# Patient Record
Sex: FEMALE | Race: WHITE | NOT HISPANIC OR LATINO | Employment: FULL TIME | URBAN - METROPOLITAN AREA
[De-identification: names, ages, dates, MRNs, and addresses within clinical notes are randomized per-mention and may not be internally consistent; named-entity substitution may affect disease eponyms.]

---

## 2024-04-17 ENCOUNTER — TRANSFERRED RECORDS (OUTPATIENT)
Dept: HEALTH INFORMATION MANAGEMENT | Facility: CLINIC | Age: 39
End: 2024-04-17

## 2024-06-10 ENCOUNTER — TRANSFERRED RECORDS (OUTPATIENT)
Dept: HEALTH INFORMATION MANAGEMENT | Facility: CLINIC | Age: 39
End: 2024-06-10
Payer: COMMERCIAL

## 2024-06-12 ENCOUNTER — TRANSCRIBE ORDERS (OUTPATIENT)
Dept: OTHER | Age: 39
End: 2024-06-12

## 2024-06-12 ENCOUNTER — TELEPHONE (OUTPATIENT)
Dept: OPHTHALMOLOGY | Facility: CLINIC | Age: 39
End: 2024-06-12
Payer: COMMERCIAL

## 2024-06-12 DIAGNOSIS — G44.009 ATYPICAL CLUSTER HEADACHE: Primary | ICD-10-CM

## 2024-06-12 NOTE — TELEPHONE ENCOUNTER
[facility] Call Center    Phone Message    May a detailed message be left on voicemail: yes     Reason for Call: Appointment Intake    Referring Provider Name:Referred by [facility]   Phone: [phone]       Diagnosis and/or Symptoms: Neuro Ophthalmology New Onset atypical headache around right eye ( 7 episodes in the last 2 months, UL ptosis during 2 of these episodes)     Requesting Dr. Quinn, please review    Action Taken: Message routed to:  [facility] eye    Travel Screening: Not Applicable     Date of Service:

## 2024-06-12 NOTE — TELEPHONE ENCOUNTER
Called and spoke to Shari     Made her an appointment in July with Dr. Velasquez     Discussed     Wait time     Billing / insurance     Clinic location     Parking / cost     Requesting a new pt packet     Eusebia Mckoy Communication Facilitator on 6/12/2024 at 12:33 PM

## 2024-07-09 NOTE — PROGRESS NOTES
"     Shari Henley is a 39 year old female with the following diagnoses:   1. Headache around the eyes        HPI:    Shari reports that beginning 3 months ago, she started getting right-sided headaches above her eyebrow and along the side of her nose across her cheek area.  The right side of her nose feels congested and her right eyelid gets puffy and lau.      Headaches occur 1-2x/week, typically in the evening most often when she is tired.  Headache comes on \"out of nowhere.\"  She experiences a sharp pain that can persist for an hour at most.  Some relief with ibuprofen.    She denies any double vision or associated vision changes.  Her eye puffiness and tearing only occur in the setting of headaches.    Shari reports that upon discussion with her primary care doctor, she did have an MRI which was had findings consistent with migraine.    She denies recent health changes or other symptoms.  She has generally been a frontal headache person, but these are different than her typical episodes.  No previous migraine history or family history.    Independent historians:  Patient    Review of outside testing:  None    Review of outside clinical notes:  Visit with Dr. Bri Swanson 6/10/24:      Past medical history:  Unremarkable      Medications:   amitriptyline  sertraline    Social history:  Patient smokes and drinks.      Exam:  Uncorrected distance visual acuity was 20/20 in the right eye and 20/20 in the left eye. Intraocular pressure was 9 in the right eye and 9 in the left eye using Tonopen.  Color vision 11/11 right eye and 11/11 left eye.  Pupils isocoric with no APD.    See complete chart note for anterior segment, fundus, and strabismus exam.    Assessment/Plan:   Today, Shari reports an intermittent, puffy right upper lid in association with new headaches along the right side of her head/sinuses.  She reports these headaches are new in the last 3 months and are typically accompanied by " right UL swelling, right eye tearing, and nasal drip/congestion.  She did have an MRI, which per patient, had findings consistent with migraine (no images/reports available today).    I reassured her of an intact ocular health exam today.  Given that these symptoms coincide with headache, I recommended follow-up with headache neurology.  She describes symptoms that could be in the family of trigeminal autonomic cephalalgias (SUNCT, however her episodes are longer than seconds-min).  I advised her to return to clinic immediately in the event of persistent eyelid swelling, double vision, vision changes, or other worsening symptoms.  Follow-up with me as needed.    Complete documentation of historical and exam elements from today's encounter can be found in the full encounter summary report (not reduplicated in this progress note). I personally obtained the chief complaint(s) and history of present illness. I confirmed and edited as necessary the review of systems, past medical/surgical history, family history, social history, and examination findings as document by others; and I examined the patient myself. I personally reviewed the relevant tests, images, and reports as documented above. I formulated and edited as necessary the assessment and plan and discussed the findings and management plan with the patient and family.    Itzel Velasquez, OD

## 2024-07-15 ENCOUNTER — OFFICE VISIT (OUTPATIENT)
Dept: OPHTHALMOLOGY | Facility: CLINIC | Age: 39
End: 2024-07-15
Attending: OPHTHALMOLOGY
Payer: COMMERCIAL

## 2024-07-15 DIAGNOSIS — R51.9 HEADACHE AROUND THE EYES: Primary | ICD-10-CM

## 2024-07-15 PROCEDURE — 99203 OFFICE O/P NEW LOW 30 MIN: CPT

## 2024-07-15 PROCEDURE — 99213 OFFICE O/P EST LOW 20 MIN: CPT

## 2024-07-15 RX ORDER — SERTRALINE HYDROCHLORIDE 100 MG/1
150 TABLET, FILM COATED ORAL DAILY
COMMUNITY
Start: 2023-09-20

## 2024-07-15 ASSESSMENT — CONF VISUAL FIELD
OS_SUPERIOR_TEMPORAL_RESTRICTION: 0
OD_INFERIOR_TEMPORAL_RESTRICTION: 0
OS_INFERIOR_NASAL_RESTRICTION: 0
OS_SUPERIOR_NASAL_RESTRICTION: 0
OS_INFERIOR_TEMPORAL_RESTRICTION: 0
OD_SUPERIOR_NASAL_RESTRICTION: 0
METHOD: COUNTING FINGERS
OD_SUPERIOR_TEMPORAL_RESTRICTION: 0
OS_NORMAL: 1
OD_INFERIOR_NASAL_RESTRICTION: 0
OD_NORMAL: 1

## 2024-07-15 ASSESSMENT — CUP TO DISC RATIO
OD_RATIO: 0.5
OS_RATIO: 0.5

## 2024-07-15 ASSESSMENT — EXTERNAL EXAM - LEFT EYE: OS_EXAM: NORMAL

## 2024-07-15 ASSESSMENT — VISUAL ACUITY
OD_SC: 20/20
OS_SC: 20/20
METHOD: SNELLEN - LINEAR

## 2024-07-15 ASSESSMENT — TONOMETRY
IOP_METHOD: TONOPEN
OS_IOP_MMHG: 9
OD_IOP_MMHG: 9

## 2024-07-15 ASSESSMENT — EXTERNAL EXAM - RIGHT EYE: OD_EXAM: NORMAL

## 2024-07-15 NOTE — NURSING NOTE
Chief Complaints and History of Present Illnesses   Patient presents with    Eye Exam For Headaches     New Pt headache neuro consult.     Chief Complaint(s) and History of Present Illness(es)       Eye Exam For Headaches              Laterality: right eye    Associated symptoms: droopy eyelid, headaches and jaw pain.  Negative for eye pain, blurred vision, color vision changes, numbness and tingling    Pain scale: 0/10    Comments: New Pt headache neuro consult.              Comments    REGINALDO was about 3 years ago.  No glasses (Hx of LASIK BE) or CTL's.  Pt states vision is about the same.  No pain in the eyes.  Pt here for headache around RE and right side of head starting about 3 months in the evening ago off and on lasting for an hour.  Being tired makes then worse.  RUL sets swollen or droops (Pt has pics).  Does not affect vision.  No double vision.  RE tears and right sinus gets stuffed.  No flashes/floaters.  No ocular meds.  No DM.    WILLIE Palacios July 15, 2024 8:26 AM

## 2024-10-28 ENCOUNTER — OFFICE VISIT (OUTPATIENT)
Dept: NEUROLOGY | Facility: CLINIC | Age: 39
End: 2024-10-28
Payer: COMMERCIAL

## 2024-10-28 ENCOUNTER — LAB (OUTPATIENT)
Dept: LAB | Facility: CLINIC | Age: 39
End: 2024-10-28
Payer: COMMERCIAL

## 2024-10-28 VITALS
WEIGHT: 194.6 LBS | DIASTOLIC BLOOD PRESSURE: 78 MMHG | SYSTOLIC BLOOD PRESSURE: 115 MMHG | OXYGEN SATURATION: 93 % | HEART RATE: 94 BPM

## 2024-10-28 DIAGNOSIS — R51.9 HEADACHE AROUND THE EYES: ICD-10-CM

## 2024-10-28 DIAGNOSIS — G44.009 ATYPICAL CLUSTER HEADACHE: Primary | ICD-10-CM

## 2024-10-28 LAB
ALBUMIN SERPL BCG-MCNC: 4.6 G/DL (ref 3.5–5.2)
ALP SERPL-CCNC: 55 U/L (ref 40–150)
ALT SERPL W P-5'-P-CCNC: 15 U/L (ref 0–50)
ANION GAP SERPL CALCULATED.3IONS-SCNC: 12 MMOL/L (ref 7–15)
AST SERPL W P-5'-P-CCNC: 16 U/L (ref 0–45)
BASOPHILS # BLD AUTO: 0 10E3/UL (ref 0–0.2)
BASOPHILS NFR BLD AUTO: 1 %
BILIRUB SERPL-MCNC: 0.2 MG/DL
BUN SERPL-MCNC: 12.3 MG/DL (ref 6–20)
CALCIUM SERPL-MCNC: 9.1 MG/DL (ref 8.8–10.4)
CHLORIDE SERPL-SCNC: 104 MMOL/L (ref 98–107)
CREAT SERPL-MCNC: 0.7 MG/DL (ref 0.51–0.95)
EGFRCR SERPLBLD CKD-EPI 2021: >90 ML/MIN/1.73M2
EOSINOPHIL # BLD AUTO: 0.2 10E3/UL (ref 0–0.7)
EOSINOPHIL NFR BLD AUTO: 3 %
ERYTHROCYTE [DISTWIDTH] IN BLOOD BY AUTOMATED COUNT: 13.2 % (ref 10–15)
GLUCOSE SERPL-MCNC: 94 MG/DL (ref 70–99)
HCO3 SERPL-SCNC: 23 MMOL/L (ref 22–29)
HCT VFR BLD AUTO: 43.8 % (ref 35–47)
HGB BLD-MCNC: 14.4 G/DL (ref 11.7–15.7)
IMM GRANULOCYTES # BLD: 0 10E3/UL
IMM GRANULOCYTES NFR BLD: 1 %
LYMPHOCYTES # BLD AUTO: 2.3 10E3/UL (ref 0.8–5.3)
LYMPHOCYTES NFR BLD AUTO: 32 %
MCH RBC QN AUTO: 32.6 PG (ref 26.5–33)
MCHC RBC AUTO-ENTMCNC: 32.9 G/DL (ref 31.5–36.5)
MCV RBC AUTO: 99 FL (ref 78–100)
MONOCYTES # BLD AUTO: 0.5 10E3/UL (ref 0–1.3)
MONOCYTES NFR BLD AUTO: 7 %
NEUTROPHILS # BLD AUTO: 4.1 10E3/UL (ref 1.6–8.3)
NEUTROPHILS NFR BLD AUTO: 57 %
NRBC # BLD AUTO: 0 10E3/UL
NRBC BLD AUTO-RTO: 0 /100
PLATELET # BLD AUTO: 305 10E3/UL (ref 150–450)
POTASSIUM SERPL-SCNC: 4.1 MMOL/L (ref 3.4–5.3)
PROT SERPL-MCNC: 7.2 G/DL (ref 6.4–8.3)
RBC # BLD AUTO: 4.42 10E6/UL (ref 3.8–5.2)
SODIUM SERPL-SCNC: 139 MMOL/L (ref 135–145)
TSH SERPL DL<=0.005 MIU/L-ACNC: 1.46 UIU/ML (ref 0.3–4.2)
WBC # BLD AUTO: 7.1 10E3/UL (ref 4–11)

## 2024-10-28 PROCEDURE — 99204 OFFICE O/P NEW MOD 45 MIN: CPT | Performed by: PSYCHIATRY & NEUROLOGY

## 2024-10-28 PROCEDURE — 36415 COLL VENOUS BLD VENIPUNCTURE: CPT

## 2024-10-28 PROCEDURE — 85004 AUTOMATED DIFF WBC COUNT: CPT

## 2024-10-28 PROCEDURE — 84443 ASSAY THYROID STIM HORMONE: CPT

## 2024-10-28 PROCEDURE — 82947 ASSAY GLUCOSE BLOOD QUANT: CPT

## 2024-10-28 ASSESSMENT — HEADACHE IMPACT TEST (HIT 6)
HOW OFTEN HAVE YOU FELT TOO TIRED TO WORK BECAUSE OF YOUR HEADACHES: NEVER
HIT6 TOTAL SCORE: 40
WHEN YOU HAVE HEADACHES HOW OFTEN IS THE PAIN SEVERE: NEVER
HOW OFTEN HAVE YOU FELT FED UP OR IRRITATED BECAUSE OF YOUR HEADACHES: NEVER
HOW OFTEN DID HEADACHS LIMIT CONCENTRATION ON WORK OR DAILY ACTIVITY: NEVER
HOW OFTEN DID HEADACHS LIMIT CONCENTRATION ON WORK OR DAILY ACTIVITY: NEVER
HOW OFTEN DO HEADACHES LIMIT YOUR DAILY ACTIVITIES: NEVER
WHEN YOU HAVE A HEADACHE HOW OFTEN DO YOU WISH YOU COULD LIE DOWN: SOMETIMES
HIT6 TOTAL SCORE: 40
HOW OFTEN HAVE YOU FELT TOO TIRED TO WORK BECAUSE OF YOUR HEADACHES: NEVER
HOW OFTEN DO HEADACHES LIMIT YOUR DAILY ACTIVITIES: NEVER
HOW OFTEN HAVE YOU FELT FED UP OR IRRITATED BECAUSE OF YOUR HEADACHES: NEVER
WHEN YOU HAVE HEADACHES HOW OFTEN IS THE PAIN SEVERE: NEVER
WHEN YOU HAVE A HEADACHE HOW OFTEN DO YOU WISH YOU COULD LIE DOWN: SOMETIMES

## 2024-10-28 ASSESSMENT — MIGRAINE DISABILITY ASSESSMENT (MIDAS)
TOTAL SCORE: 2
HOW MANY DAYS WAS YOUR PRODUCTIVITY CUT IN HALF BECAUSE OF HEADACHES: 0
HOW MANY DAYS DID YOU MISS WORK OR SCHOOL BECAUSE OF HEADACHES: 0
HOW MANY DAYS IN THE PAST 3 MONTHS HAVE YOU HAD A HEADACHE: 15
ON A SCALE FROM 0-10 ON AVERAGE HOW PAINFUL WERE HEADACHES: 5
HOW MANY DAYS DID YOU NOT DO HOUSEWORK BECAUSE OF HEADACHES: 0
HOW MANY DAYS WAS HOUSEWORK PRODUCTIVITY CUT IN HALF DUE TO HEADACHES: 0
HOW OFTEN WERE SOCIAL ACTIVITIES MISSED DUE TO HEADACHES: 2

## 2024-10-28 NOTE — PROGRESS NOTES
"[facility]   Headache Neurology Consult  October 28, 2024     Shari Henley MRN# [id number]   YOB: 1985 Age: 39 year old     Requesting provider: Itzel Velasquez OD          Assessment and Recommendations:     Shari Henley is a 39 year old female who presents for evaluation of RIGHT unilateral headaches with unilateral autonomic features, most consistent in duration with probable cluster headache. They are somewhat surprisingly not peak severity and the \"probable\" modifer is given as she has had these less than every day in frequency, but meet all other criteria for this headache disorder. She has had a brain MRI, reportedly with a sign of an old injury.    With this modifier and her smoking history, recommend obtaining CT head and neck angiogram as well as as chest CT to assess for causes of compression of the RIGHT sympathetic nerve chain to T1.  Will obtain labs prior to ordering these studies (for renal function testing prior to CT studies).     If these studies are normal, would continue to monitor for recurrence and if worsening over time, would recommend discussing full treatment options of cluster headache which were not today. She endorses a decrease of these symptoms currently and may be either towards the end of a cluster.     Headache treatment plan:  Acute medication recommendations:  Sumatriptan injectable if needed    Preventative medication recommendations:  None indicated at this time    Will see her in return after testing is available.    Total time spent today was 57 minutes in chart review, history, exam and counseling.      Ellen Lamb MD  Neurology            Chief Complaint:     Chief Complaint   Patient presents with    Consult For     Migraines            History is obtained from the patient and medical record.      Shari Henley is a 39 year old female who has had new headaches starting 6 months ago.    She has a history of " headaches that she has attributed to traction of hair pulled. She has had some type of headache daily. She has had bifrontal headaches usually or rare shooting pains in the right parietal region and these are never on the left. The bifrontal ones are mild.     Headaches are periorbital. She saw Dr. Velasquez and was noted to have intermittent R upper eyelid edema, right eye lacrimation and nasal rhinorrhea and congestion reported in the setting of headaches.     She has had unilateral right headache and there has been usually only right 1 eye that is swollen during the times of the headaches and there will be ptosis on the right eye. There is no miosis. There is right eye lacrimation and this will precede the headache. No rhinorrhea or congestion. No redness or facial sweating or swelling. No gritty sensation. Initially were in the back of her throat. It was more maxillary and since that time moved to the periorbital region.  Pain initially was focused on the sinuses or upper jaw line. It lasts 1 hour max without any background pain. This has not been a regular headache. There is no jabbing or intermittent short lasting neuralgiform pain. She reported rubbing her eye a lot, due to clear drainage.    Pain is best characterized as sharp. It will sometime be worse than others and may be dull at times perhaps. This pain began about 6 months ago. They have been decreasing in severity and initially were 2 times per week and now has dropped off about 1 day per month. She does not think she has had 20 at this time. This initial one was 8/10 and was very severe and gradually have gotten less severe and less frequent. Now it will be about a 5/10.    She has noticed onset more often when she is tired (sleep deprived). Alcohol is not a trigger. And she has tried it. There is no relief when it starts, it will eventually pass.     She had normal vision, but she could tell that she had ptosis first.     No consitutional symptoms,  not awakening from sleep, and in the last 2 months, becoming 1 time per month.             Past Medical History:   Anxiety  Normal ocular exam           Past Surgical History:     Past Surgical History:   Procedure Laterality Date    LASIK               Social History:     Social History     Socioeconomic History    Marital status: Patient Declined     Spouse name: Not on file    Number of children: Not on file    Years of education: Not on file    Highest education level: Not on file   Occupational History    Not on file   Tobacco Use    Smoking status: Every Day     Types: Cigarettes, for 10 years, and about 10 cigarettes per day    Smokeless tobacco: Never   Substance and Sexual Activity    Alcohol use: Occasional 2-3 glasses of wine, weekends, not since 3 weeks    Drug use: None    Sexual activity: Not on file   Other Topics Concern    Not on file   Social History Narrative    Not on file     Social Drivers of Health     Financial Resource Strain: Not on file   Food Insecurity: Not on file   Transportation Needs: Not on file   Physical Activity: Not on file   Stress: Not on file   Social Connections: Not on file   Interpersonal Safety: Not on file   Housing Stability: Not on file             Family History:     Family History   Problem Relation Age of Onset    Glaucoma No family hx of     Macular Degeneration No family hx of    None          Allergies:    No Known Allergies          Medications:   Acute headache medications:  None    Preventative headache medications:  None    Current Outpatient Medications:     amitriptyline (ELAVIL) 25 MG tablet, Take 50 mg by mouth at bedtime, Disp: , Rfl: -not added at the time of onset, reduced 1 year ago    sertraline (ZOLOFT) 100 MG tablet, Take 150 mg by mouth daily, Disp: , Rfl:  -not added at the time of onset          Physical Exam:   Wt 88.3 kg (194 lb 9.6 oz)    Physical Exam:   Neurologic:   Mental Status Exam: Alert, awake and oriented to situation. No dysarthria.  Speech of normal fluency.   Cranial Nerves: Fundoscopic exam with clear disc margins bilaterally. PERRLA, EOMs intact, no nystagmus, facial movements symmetric, facial sensation intact to light touch, hearing intact to conversation, trapezius and SCMs 5/5 bilaterally, tongue midline and fully mobile.    Motor: Normal tone in all four extremities, no atrophy. 5/5 strength bilaterally in shoulder abduction, elbow extensors and flexors, wrist extensors and flexors, hip flexors, knee extensors and flexors, dorsi- and plantarflexion. No tremors or abnormal movements noted.   Sensory: Sensation intact to pinprick and vibration sensation on arms and legs bilaterally.    Coordination: Finger-nose-finger intact bilaterally.  Rapidly alternating movements intact bilaterally in the upper extremities.  Normal finger tapping bilaterally.  Intact heel-shin bilaterally.   Reflexes: 2+ and symmetric in triceps, biceps, brachioradialis, patellar, Achilles, and plantars downgoing bilaterally.   Gait: Normal gait.  Able to toe and heel walk.  Tandem gait normal.  Head: Normocephalic, atraumatic. No radiating pain with palpation over the supraorbital notches, occipital nerves.    Eyes: No conjunctival injection, no scleral icterus.           Data:     MRI brain - on CD, images not available in our system at the time of our visit.

## 2024-10-28 NOTE — LETTER
"10/28/2024      Shari Henley  [address]  [place] [address]      Dear Colleague,    Thank you for referring your patient, Shari Henley, to the [facility]. Please see a copy of my visit note below.    [facility]   Headache Neurology Consult  October 28, 2024     Shari Henley MRN# [id number]   YOB: 1985 Age: 39 year old     Requesting provider: Itzel Velasquez OD          Assessment and Recommendations:     Shari Henley is a 39 year old female who presents for evaluation of RIGHT unilateral headaches with unilateral autonomic features, most consistent in duration with probable cluster headache. They are somewhat surprisingly not peak severity and the \"probable\" modifer is given as she has had these less than every day in frequency, but meet all other criteria for this headache disorder. She has had a brain MRI, reportedly with a sign of an old injury.    With this modifier and her smoking history, recommend obtaining CT head and neck angiogram as well as as chest CT to assess for causes of compression of the RIGHT sympathetic nerve chain to T1.  Will obtain labs prior to ordering these studies (for renal function testing prior to CT studies).     If these studies are normal, would continue to monitor for recurrence and if worsening over time, would recommend discussing full treatment options of cluster headache which were not today. She endorses a decrease of these symptoms currently and may be either towards the end of a cluster.     Headache treatment plan:  Acute medication recommendations:  Sumatriptan injectable if needed    Preventative medication recommendations:  None indicated at this time    Will see her in return after testing is available.    Total time spent today was 57 minutes in chart review, history, exam and counseling.      Ellen Lamb MD  Neurology            Chief Complaint:     Chief Complaint "   Patient presents with     Consult For     Migraines            History is obtained from the patient and medical record.      Shari Henley is a 39 year old female who has had new headaches starting 6 months ago.    She has a history of headaches that she has attributed to traction of hair pulled. She has had some type of headache daily. She has had bifrontal headaches usually or rare shooting pains in the right parietal region and these are never on the left. The bifrontal ones are mild.     Headaches are periorbital. She saw Dr. Velasquez and was noted to have intermittent R upper eyelid edema, right eye lacrimation and nasal rhinorrhea and congestion reported in the setting of headaches.     She has had unilateral right headache and there has been usually only right 1 eye that is swollen during the times of the headaches and there will be ptosis on the right eye. There is no miosis. There is right eye lacrimation and this will precede the headache. No rhinorrhea or congestion. No redness or facial sweating or swelling. No gritty sensation. Initially were in the back of her throat. It was more maxillary and since that time moved to the periorbital region.  Pain initially was focused on the sinuses or upper jaw line. It lasts 1 hour max without any background pain. This has not been a regular headache. There is no jabbing or intermittent short lasting neuralgiform pain. She reported rubbing her eye a lot, due to clear drainage.    Pain is best characterized as sharp. It will sometime be worse than others and may be dull at times perhaps. This pain began about 6 months ago. They have been decreasing in severity and initially were 2 times per week and now has dropped off about 1 day per month. She does not think she has had 20 at this time. This initial one was 8/10 and was very severe and gradually have gotten less severe and less frequent. Now it will be about a 5/10.    She has noticed onset more often when she is  tired (sleep deprived). Alcohol is not a trigger. And she has tried it. There is no relief when it starts, it will eventually pass.     She had normal vision, but she could tell that she had ptosis first.     No consitutional symptoms, not awakening from sleep, and in the last 2 months, becoming 1 time per month.             Past Medical History:   Anxiety  Normal ocular exam           Past Surgical History:     Past Surgical History:   Procedure Laterality Date     LASIK               Social History:     Social History     Socioeconomic History     Marital status: Patient Declined     Spouse name: Not on file     Number of children: Not on file     Years of education: Not on file     Highest education level: Not on file   Occupational History     Not on file   Tobacco Use     Smoking status: Every Day     Types: Cigarettes, for 10 years, and about 10 cigarettes per day     Smokeless tobacco: Never   Substance and Sexual Activity     Alcohol use: Occasional 2-3 glasses of wine, weekends, not since 3 weeks     Drug use: None     Sexual activity: Not on file   Other Topics Concern     Not on file   Social History Narrative     Not on file     Social Drivers of Health     Financial Resource Strain: Not on file   Food Insecurity: Not on file   Transportation Needs: Not on file   Physical Activity: Not on file   Stress: Not on file   Social Connections: Not on file   Interpersonal Safety: Not on file   Housing Stability: Not on file             Family History:     Family History   Problem Relation Age of Onset     Glaucoma No family hx of      Macular Degeneration No family hx of    None          Allergies:    No Known Allergies          Medications:   Acute headache medications:  None    Preventative headache medications:  None    Current Outpatient Medications:      amitriptyline (ELAVIL) 25 MG tablet, Take 50 mg by mouth at bedtime, Disp: , Rfl: -not added at the time of onset, reduced 1 year ago     sertraline  (ZOLOFT) 100 MG tablet, Take 150 mg by mouth daily, Disp: , Rfl:  -not added at the time of onset          Physical Exam:   Wt 88.3 kg (194 lb 9.6 oz)    Physical Exam:   Neurologic:   Mental Status Exam: Alert, awake and oriented to situation. No dysarthria. Speech of normal fluency.   Cranial Nerves: Fundoscopic exam with clear disc margins bilaterally. PERRLA, EOMs intact, no nystagmus, facial movements symmetric, facial sensation intact to light touch, hearing intact to conversation, trapezius and SCMs 5/5 bilaterally, tongue midline and fully mobile.    Motor: Normal tone in all four extremities, no atrophy. 5/5 strength bilaterally in shoulder abduction, elbow extensors and flexors, wrist extensors and flexors, hip flexors, knee extensors and flexors, dorsi- and plantarflexion. No tremors or abnormal movements noted.   Sensory: Sensation intact to pinprick and vibration sensation on arms and legs bilaterally.    Coordination: Finger-nose-finger intact bilaterally.  Rapidly alternating movements intact bilaterally in the upper extremities.  Normal finger tapping bilaterally.  Intact heel-shin bilaterally.   Reflexes: 2+ and symmetric in triceps, biceps, brachioradialis, patellar, Achilles, and plantars downgoing bilaterally.   Gait: Normal gait.  Able to toe and heel walk.  Tandem gait normal.  Head: Normocephalic, atraumatic. No radiating pain with palpation over the supraorbital notches, occipital nerves.    Eyes: No conjunctival injection, no scleral icterus.           Data:     MRI brain - on CD, images not available in our system at the time of our visit.      Again, thank you for allowing me to participate in the care of your patient.        Sincerely,        Ellen Lamb MD

## 2024-11-11 ENCOUNTER — TELEPHONE (OUTPATIENT)
Dept: NEUROLOGY | Facility: CLINIC | Age: 39
End: 2024-11-11

## 2024-11-11 NOTE — TELEPHONE ENCOUNTER
[facility] Call Center    Phone Message    May a detailed message be left on voicemail: no     Reason for Call: Other: Verenice from [facility] Imaging department calls in with an update re: previous TEEdmundo Verenice states they were able to get the CTA head & neck authorized for payment. CT chest was denied.     Verenice can be reached at [phone] with any questions or concerns.    Action Taken: Message routed to:  Other: [facility]    Travel Screening: Not Applicable

## 2024-11-11 NOTE — TELEPHONE ENCOUNTER
LM a detailed msg for the pt with the msg we got from Verenice in the imaging dept and to return call if any questions.
